# Patient Record
(demographics unavailable — no encounter records)

---

## 2024-11-12 NOTE — PLAN
[FreeTextEntry1] :  patient to follow up in 1 year for annual visit will follow up pap and gc results start OCPs at this time. rx for era sent. she is to call me with any questions or concerns  all of her questions were answered she is agreeable with plan  working as an /